# Patient Record
Sex: FEMALE | Race: WHITE | NOT HISPANIC OR LATINO | URBAN - METROPOLITAN AREA
[De-identification: names, ages, dates, MRNs, and addresses within clinical notes are randomized per-mention and may not be internally consistent; named-entity substitution may affect disease eponyms.]

---

## 2022-04-21 ENCOUNTER — AMBULATORY SURGICAL CENTER (OUTPATIENT)
Dept: URBAN - METROPOLITAN AREA SURGERY 19 | Facility: SURGERY | Age: 57
End: 2022-04-21
Payer: COMMERCIAL

## 2022-04-21 DIAGNOSIS — Z12.11 ENCOUNTER FOR SCREENING FOR MALIGNANT NEOPLASM OF COLON: ICD-10-CM

## 2022-04-21 DIAGNOSIS — D12.2 BENIGN NEOPLASM OF ASCENDING COLON: ICD-10-CM

## 2022-04-21 PROCEDURE — 45385 COLONOSCOPY W/LESION REMOVAL: CPT | Mod: 33 | Performed by: INTERNAL MEDICINE

## 2025-05-29 ENCOUNTER — TELEHEALTH PROVIDED OTHER THAN IN PATIENT'S HOME (OUTPATIENT)
Dept: URBAN - METROPOLITAN AREA CLINIC 67 | Facility: CLINIC | Age: 60
End: 2025-05-29
Payer: COMMERCIAL

## 2025-05-29 VITALS — HEIGHT: 60 IN | WEIGHT: 135 LBS

## 2025-05-29 DIAGNOSIS — Z86.0101 PERSONAL HISTORY OF ADENOMATOUS AND SERRATED COLON POLYPS: ICD-10-CM

## 2025-05-29 DIAGNOSIS — Z09 ENCOUNTER FOR FOLLOW-UP EXAMINATION AFTER COMPLETED TREATMEN: ICD-10-CM

## 2025-05-29 PROCEDURE — 99202 OFFICE O/P NEW SF 15 MIN: CPT | Mod: GT

## 2025-05-29 RX ORDER — SODIUM SULFATE, POTASSIUM SULFATE, MAGNESIUM SULFATE 1.6; 3.13; 17.5 G/ML; G/ML; G/ML
SOLUTION, CONCENTRATE ORAL
Qty: 1 | Refills: 0 | Status: ACTIVE
Start: 2025-05-29

## 2025-05-29 NOTE — SERVICENOTES
Telehealth Platform Used: [employer] 
Location of patient: Work
Location of provider: [place]
Other persons participating: N/A

## 2025-05-29 NOTE — SERVICEHPINOTES
Patient reports to discuss having a colonoscopy.brlast colonoscopy was 4/21/22- Dr Xiong- 3 polyps, TA and Hyperplastic, recall 3 years.
br
br   Can climb 3 flights of steps with some SOB but no SOB otherwise. Emphysema on CT, mother with lung cancer. Only has the CT scan due to family hstory in her mom.
br 
She does worry about anesthesia.
br
br BMs are going well, no constipation, no blood or abdominal pain, no GERD. 
br Alcohol- 1- 12oz coke with 2 cocktails at night. 2 shots. br

## 2025-07-28 LAB
COLONOSCOPY STUDY: (no result)
COLONOSCOPY STUDY: (no result)